# Patient Record
Sex: FEMALE | Race: WHITE
[De-identification: names, ages, dates, MRNs, and addresses within clinical notes are randomized per-mention and may not be internally consistent; named-entity substitution may affect disease eponyms.]

---

## 2018-10-13 ENCOUNTER — HOSPITAL ENCOUNTER (EMERGENCY)
Dept: HOSPITAL 59 - ER | Age: 53
LOS: 1 days | Discharge: HOME | End: 2018-10-14
Payer: COMMERCIAL

## 2018-10-13 DIAGNOSIS — T63.441A: Primary | ICD-10-CM

## 2018-10-13 DIAGNOSIS — R22.1: ICD-10-CM

## 2018-10-13 PROCEDURE — 96372 THER/PROPH/DIAG INJ SC/IM: CPT

## 2018-10-13 PROCEDURE — 99282 EMERGENCY DEPT VISIT SF MDM: CPT

## 2018-10-13 PROCEDURE — 99283 EMERGENCY DEPT VISIT LOW MDM: CPT

## 2018-10-13 NOTE — EMERGENCY DEPARTMENT RECORD
History of Present Illness





- General


Chief complaint: Bite Insect/other


Stated complaint: BEE STING


Time Seen by Provider: 10/13/18 23:51


Source: Patient, RN notes reviewed





- History of Present Illness


Initial comments: 


bee sting to the left side of his neck and this happened 10 hours ago and she 

only has swelling and redness in the bite area only. She denies having an 

anaphylactic reactions in the past. No stingers present





MD complaint: Insect bite/sting





- Related Data


 Home Medications











 Medication  Instructions  Recorded  Confirmed  Last Taken


 


Cholecalciferol (Vitamin D3) 5,000 unit PO DAILY 10/13/18 10/13/18 10/13/18





[Vitamin D3]    


 


Omega-3 Fatty Acids/Fish Oil [Fish 1 each PO DAILY 10/13/18 10/13/18 Unknown





Oil 1,000 mg Capsule]    


 


Venlafaxine HCl [Effexor Xr] 37.5 mg PO DAILY 10/13/18 10/13/18 10/13/18








 Previous Rx's











 Medication  Instructions  Recorded


 


Prednisone [Prednisone 20Mg] 20 mg PO BID #10 tab 10/14/18











 Allergies











Allergy/AdvReac Type Severity Reaction Status Date / Time


 


Penicillins AdvReac  PT UNSURE Verified 10/13/18 23:39





   OF REACTION  














Review of Systems


Reviewed: No additional complaints except as noted below


Constitutional: Reports: As per HPI.  Denies: Chills, Fever, Malaise, Night 

sweats, Weakness, Weight change


Eyes: Reports: As per HPI.  Denies: Eye discharge, Eye pain, Photophobia, 

Vision change


ENT: Reports: As per HPI.  Denies: Congestion, Dental pain, Ear pain, Epistaxis

, Hearing loss, Throat pain


Respiratory: Reports: As per HPI.  Denies: Cough, Dyspnea, Hemoptysis, Stridor, 

Wheezes


Cardiovascular: Reports: As per HPI.  Denies: Arrhythmia, Chest pain, Dyspnea 

on exertion, Edema, Murmurs, Orthopnea, Palpitations, Paroxysmal nocturnal 

dyspnea, Rheumatic Fever, Syncope


Endocrine: Reports: As per HPI.  Denies: Fatigue, Heat or cold intolerance, 

Polydipsia, Polyuria


Gastrointestinal: Reports: As per HPI.  Denies: Abdominal pain, Constipation, 

Diarrhea, Hematemesis, Hematochezia, Melena, Nausea, Vomiting


Genitourinary: Reports: As per HPI.  Denies: Abnormal menses, Discharge, 

Dyspareunia, Dysuria, Frequency, Hematuria, Incontinence, Retention, Urgency


Musculoskeletal: Reports: As per HPI.  Denies: Arthralgia, Back pain, Gout, 

Joint swelling, Myalgia, Neck pain


Skin: Reports: As per HPI.  Denies: Bruising, Change in color, Change in hair/

nails, Lesions, Pruritus, Rash


Neurological: Reports: As per HPI.  Denies: Abnormal gait, Confusion, Headache, 

Numbness, Paresthesias, Seizure, Tingling, Tremors, Vertigo, Weakness


Psychiatric: Reports: As per HPI.  Denies: Anxiety, Auditory hallucinations, 

Depression, Homicidal thoughts, Suicidal thoughts, Visual hallucinations


Hematological/Lymphatic: Reports: As per HPI.  Denies: Anemia, Blood Clots, 

Easy bleeding, Easy bruising, Swollen glands





Physical Exam





- General


General Appearance: Alert, Oriented x3, Cooperative, No acute distress





- Head


Head exam: Normal inspection





- Eye


Eye exam: Normal appearance, PERRL


Pupils: Normal accommodation





- ENT


ENT exam: Normal exam, Mucous membranes moist, Normal external ear exam, Normal 

orophraynx, TM's normal bilaterally


Ear exam: Normal external inspection.  negative: External canal tenderness


Nasal Exam: Normal inspection.  negative: Discharge, Sinus tenderness


Mouth exam: Normal external inspection, Tongue normal


Teeth exam: Normal inspection.  negative: Dental caries


Throat exam: Normal inspection.  negative: Tonsillar erythema, Tonsillar exudate





- Neck


Neck exam: Normal inspection, Full ROM.  negative: Tenderness





- Respiratory


Respiratory exam: Normal lung sounds bilaterally.  negative: Respiratory 

distress





- Cardiovascular


Cardiovascular Exam: Regular rate, Normal rhythm, Normal heart sounds





- GI/Abdominal


GI/Abdominal exam: Soft, Normal bowel sounds.  negative: Tenderness





- Rectal


Rectal exam: Deferred





- 


 exam: Deferred





- Extremities


Extremities exam: Normal inspection, Full ROM, Normal capillary refill.  

negative: Tenderness





- Back


Back exam: Reports: Normal inspection, Full ROM.  Denies: Muscle spasm, Rash 

noted, Tenderness





- Neurological


Neurological exam: Alert, Normal gait, Oriented X3, Reflexes normal





- Psychiatric


Psychiatric exam: Normal affect, Normal mood





- Skin


Skin exam: Dry, Intact, Normal color, Warm





Disposition


Clinical Impression: 


Bee sting


Qualifiers:


 Encounter type: initial encounter Injury intent: accidental or unintentional 

Qualified Code(s): T63.441A - Toxic effect of venom of bees, accidental (

unintentional), initial encounter





Disposition: Home, Self-Care


Condition: (1) Good


Instructions:  Insect Bite or Sting (ED)


Additional Instructions: 


benadryl 25 mg every 6 hours


prednisone 20 mg bid for 5 days


Prescriptions: 


Prednisone [Prednisone 20Mg] 20 mg PO BID #10 tab


Forms:  Patient Portal Access


Time of Disposition: 00:23





Quality





- Quality Measures


Quality Measures: N/A





- Blood Pressure Screening


Does Patient Have Any of the Following: No


Blood Pressure Classification: Hypertensive Reading


Systolic Measurement: 140


Diastolic Measurement: 94


Screening for High Blood Pressure: < Pre-Hypertensive BP, F/U Documented > [

]


Pre-Hypertensive Follow-up Interventions: Referral to alternative/primary care 

provider.

## 2019-06-30 ENCOUNTER — HOSPITAL ENCOUNTER (EMERGENCY)
Dept: HOSPITAL 59 - ER | Age: 54
Discharge: HOME | End: 2019-06-30
Payer: COMMERCIAL
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
## 2019-06-30 DIAGNOSIS — F17.210: ICD-10-CM
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
## 2019-06-30 DIAGNOSIS — S83.422A: Primary | ICD-10-CM

## 2019-06-30 PROCEDURE — 99283 EMERGENCY DEPT VISIT LOW MDM: CPT

## 2019-06-30 NOTE — EMERGENCY DEPARTMENT RECORD
History of Present Illness





- General


Chief complaint: Lower Extremity Pain


Stated complaint: LEFT KNEE PAIN


Time Seen by Provider: 19 09:44


Source: Patient, RN notes reviewed


Mode of Arrival: Ambulatory





- History of Present Illness


Initial comments: 


fell 14 hours ago and slipped on wet floor in her home and it swelled qickly and

pain in the lateral knee area. No previous injuries to this leg and no other 

injuries with this fall. Patient took a [id number] mg pill and that is all she is

using for pain.





Onset/Timin


-: Days(s)


Location: Left, Knee


History of Same: No


Radiation: None


Severity scale (1-10): 10


Quality: Sharp


Consistency: Constant


Improves with: Nothing


Worsens with: Walking, Weight bearing


Associated Symptoms: Denies other symptoms





- Related Data


                                    Allergies











Allergy/AdvReac Type Severity Reaction Status Date / Time


 


Penicillins AdvReac  PT UNSURE Verified 10/13/18 23:39





   OF REACTION  














Travel Screening





- Travel/Exposure Within Last 30 Days


Have you traveled within the last 30 days?: No





- Travel/Exposure Within Last Year


Have you traveled outside the [place] in the last year?: No





- Additonal Travel Details


Have you been exposed to anyone with a communicable illness?: No





- Travel Symptoms


Symptom Screening: None





Review of Systems


Reviewed: No additional complaints except as noted below


Constitutional: Reports: As per HPI.  Denies: Chills, Fever, Malaise, Night 

sweats, Weakness, Weight change


Eyes: Reports: As per HPI.  Denies: Eye discharge, Eye pain, Photophobia, Vision

change


ENT: Reports: As per HPI.  Denies: Congestion, Dental pain, Ear pain, Epistaxis,

Hearing loss, Throat pain


Respiratory: Reports: As per HPI.  Denies: Cough, Dyspnea, Hemoptysis, Stridor, 

Wheezes


Cardiovascular: Reports: As per HPI.  Denies: Arrhythmia, Chest pain, Dyspnea on

exertion, Edema, Murmurs, Orthopnea, Palpitations, Paroxysmal nocturnal dyspnea,

Rheumatic Fever, Syncope


Endocrine: Reports: As per HPI.  Denies: Fatigue, Heat or cold intolerance, 

Polydipsia, Polyuria


Gastrointestinal: Reports: As per HPI.  Denies: Abdominal pain, Constipation, 

Diarrhea, Hematemesis, Hematochezia, Melena, Nausea, Vomiting


Genitourinary: Reports: As per HPI.  Denies: Abnormal menses, Discharge, 

Dyspareunia, Dysuria, Frequency, Hematuria, Incontinence, Retention, Urgency


Musculoskeletal: Reports: As per HPI, Other (left knee pain).  Denies: 

Arthralgia, Back pain, Gout, Joint swelling, Myalgia, Neck pain


Skin: Reports: As per HPI.  Denies: Bruising, Change in color, Change in 

hair/nails, Lesions, Pruritus, Rash


Neurological: Reports: As per HPI.  Denies: Abnormal gait, Confusion, Headache, 

Numbness, Paresthesias, Seizure, Tingling, Tremors, Vertigo, Weakness


Psychiatric: Reports: As per HPI.  Denies: Anxiety, Auditory hallucinations, 

Depression, Homicidal thoughts, Suicidal thoughts, Visual hallucinations


Hematological/Lymphatic: Reports: As per HPI.  Denies: Anemia, Blood Clots, Easy

bleeding, Easy bruising, Swollen glands





Past Medical History





- SOCIAL HISTORY


Smoking Status: Current every day smoker


Alcohol Use: Occasional


Drug Use: None





- RESPIRATORY


Hx Respiratory Disorders: No





- CARDIOVASCULAR


Hx Cardio Disorders: No





- NEURO


Hx Neuro Disorders: No





- GI


Hx GI Disorders: No





- 


Hx Genitourinary Disorders: No





- ENDOCRINE


Hx Endocrine Disorders: No





- MUSCULOSKELETAL


Hx Musculoskeletal Disorders: No





- PSYCH


Hx Psych Problems: Yes


Hx Anxiety: Yes





- HEMATOLOGY/ONCOLOGY


Hx Hematology/Oncology Disorders: No





Family Medical History


Any Significant Family History?: No





Physical Exam





- General


General Appearance: Alert, Oriented x3, Cooperative, No acute distress





- Head


Head exam: Normal inspection





- Eye


Eye exam: Normal appearance, PERRL


Pupils: Normal accommodation





- ENT


ENT exam: Normal exam, Mucous membranes moist, Normal external ear exam, Normal 

orophraynx, TM's normal bilaterally


Ear exam: Normal external inspection.  negative: External canal tenderness


Nasal Exam: Normal inspection.  negative: Discharge, Sinus tenderness


Mouth exam: Normal external inspection, Tongue normal


Teeth exam: Normal inspection.  negative: Dental caries


Throat exam: Normal inspection.  negative: Tonsillar erythema, Tonsillar exudate





- Neck


Neck exam: Normal inspection, Full ROM.  negative: Tenderness





- Respiratory


Respiratory exam: Normal lung sounds bilaterally.  negative: Respiratory 

distress





- Cardiovascular


Cardiovascular Exam: Regular rate, Normal rhythm, Normal heart sounds





- GI/Abdominal


GI/Abdominal exam: Soft, Normal bowel sounds.  negative: Tenderness





- Rectal


Rectal exam: Deferred





- 


 exam: Deferred





- Extremities


Extremities exam: Normal capillary refill, Tenderness (lateral left knee 

swellling and ecchymosis and pain)





- Back


Back exam: Reports: Normal inspection, Full ROM.  Denies: Muscle spasm, Rash 

noted, Tenderness





- Neurological


Neurological exam: Alert, Normal gait, Oriented X3, Reflexes normal





- Psychiatric


Psychiatric exam: Normal affect, Normal mood





- Skin


Skin exam: Dry, Intact, Normal color, Warm





Course





                                   Vital Signs











  19





  09:29


 


Temperature 97.8 F


 


Pulse Rate 87


 


Respiratory 20





Rate 


 


Blood Pressure 147/86


 


Pulse Ox 97














Medical Decision Making





- Data Complexity


MDM Data: X-Ray Ordered and/or Reviewed (xray neg for fracture)





Disposition


Clinical Impression: 


Knee sprain


Qualifiers:


 Encounter type: initial encounter Involved ligament of knee: lateral collateral

ligament Laterality: left Qualified Code(s): S83.422A - Sprain of lateral 

collateral ligament of left knee, initial encounter





Disposition: Home, Self-Care


Condition: (1) Good


Instructions:  Knee Sprain (ED)


Additional Instructions: 


follow up with family  in one week


use motrin 800 mg three times a day and she already has that


Forms:  Patient Portal Access


Time of Disposition: 10:11





Quality





- Quality Measures


Quality Measures: N/A





- Blood Pressure Screening


Does Patient Have Any of the Following: No


Blood Pressure Classification: Pre-Hypertensive BP Reading


Systolic Measurement: 147


Diastolic Measurement: 86


Screening for High Blood Pressure: < Pre-Hypertensive BP, F/U Documented > 

[]


Pre-Hypertensive Follow-up Interventions: Referral to alternative/primary care 

provider.

## 2019-07-01 NOTE — RADIOLOGY REPORT
EXAM:  KNEE, LEFT 3 VIEWS



HISTORY:  PATIENT INJURED YESTERDAY, LATERAL PAIN.



TECHNIQUE:  Three views.



COMPARISON:  None.



FINDINGS:  Joint spaces are well maintained. There is no acute fracture 
identified. No destructive or erosive change. There is a small joint effusion. 
There is a chronic-appearing calcification near the lateral tibial spine. 



IMPRESSION:  

1.  NO FRACTURE IDENTIFIED.

2.  SMALL JOINT EFFUSION.

3.  CHRONIC CALCIFICATION NEAR THE LATERAL TIBIAL SPINE.



JOB NUMBER:  [id number]

[facility]

## 2019-07-23 ENCOUNTER — HOSPITAL ENCOUNTER (EMERGENCY)
Dept: HOSPITAL 59 - ER | Age: 54
Discharge: HOME | End: 2019-07-23
Payer: COMMERCIAL

## 2019-07-23 DIAGNOSIS — K56.690: Primary | ICD-10-CM

## 2019-07-23 DIAGNOSIS — F17.210: ICD-10-CM

## 2019-07-23 LAB
ABSOLUTE NEUTROPHIL COUNT: 5.07
ALBUMIN SERPL-MCNC: 4.6 G/DL (ref 4–5)
ALBUMIN/GLOB SERPL: 2.1 {RATIO} (ref 1.1–1.8)
ALP SERPL-CCNC: 109 U/L (ref 35–104)
ALT SERPL-CCNC: 26 U/L (ref ?–33)
ANION GAP SERPL CALC-SCNC: 14 MMOL/L (ref 7–16)
APPEARANCE UR: CLEAR
AST SERPL-CCNC: 18 U/L (ref 10–35)
BASOPHILS NFR BLD: 0.4 % (ref 0–6)
BILIRUB SERPL-MCNC: 0.3 MG/DL (ref 0.2–1)
BILIRUB UR-MCNC: NEGATIVE MG/DL
BUN SERPL-MCNC: 16 MG/DL (ref 6–20)
CO2 SERPL-SCNC: 26 MMOL/L (ref 22–29)
COLOR UR: YELLOW
CREAT SERPL-MCNC: 0.6 MG/DL (ref 0.5–0.9)
EOSINOPHIL NFR BLD: 1.7 % (ref 0–6)
ERYTHROCYTE [DISTWIDTH] IN BLOOD BY AUTOMATED COUNT: 13.3 % (ref 11.5–14.5)
EST GLOMERULAR FILTRATION RATE: > 60 ML/MIN
GLOBULIN SER-MCNC: 2.2 GM/DL (ref 1.4–4.8)
GLUCOSE SERPL-MCNC: 135 MG/DL (ref 74–109)
GLUCOSE UR STRIP-MCNC: NEGATIVE MG/DL
GRANULOCYTES NFR BLD: 62.9 % (ref 47–80)
HCT VFR BLD CALC: 44.5 % (ref 35–47)
HGB BLD-MCNC: 15.3 GM/DL (ref 11.6–16)
KETONES UR QL STRIP: NEGATIVE
LIPASE SERPL-CCNC: 21 U/L (ref 13–60)
LYMPHOCYTES NFR BLD AUTO: 27.9 % (ref 16–45)
MCH RBC QN AUTO: 32.1 PG (ref 27–33)
MCHC RBC AUTO-ENTMCNC: 34.4 G/DL (ref 32–36)
MCV RBC AUTO: 93.5 FL (ref 81–97)
MONOCYTES NFR BLD: 7.1 % (ref 0–9)
NITRITE UR QL STRIP: NEGATIVE
PLATELET # BLD: 181 K/UL (ref 130–400)
PMV BLD AUTO: 11.2 FL (ref 7.4–10.4)
PROT SERPL-MCNC: 6.8 G/DL (ref 6.6–8.7)
PROT UR QL STRIP: NEGATIVE
RBC # BLD AUTO: 4.76 M/UL (ref 3.8–5.4)
RBC # UR STRIP: NEGATIVE /UL
URINE LEUKOCYTE ESTERASE: NEGATIVE
UROBILINOGEN UR STRIP-ACNC: 0.2 E.U./DL (ref 0.2–1)
WBC # BLD AUTO: 8.1 K/UL (ref 4.2–12.2)

## 2019-07-23 PROCEDURE — 83690 ASSAY OF LIPASE: CPT

## 2019-07-23 PROCEDURE — 80053 COMPREHEN METABOLIC PANEL: CPT

## 2019-07-23 PROCEDURE — 81003 URINALYSIS AUTO W/O SCOPE: CPT

## 2019-07-23 PROCEDURE — 96360 HYDRATION IV INFUSION INIT: CPT

## 2019-07-23 PROCEDURE — 85025 COMPLETE CBC W/AUTO DIFF WBC: CPT

## 2019-07-23 PROCEDURE — 99283 EMERGENCY DEPT VISIT LOW MDM: CPT

## 2019-07-23 PROCEDURE — 74176 CT ABD & PELVIS W/O CONTRAST: CPT

## 2019-07-23 PROCEDURE — 99284 EMERGENCY DEPT VISIT MOD MDM: CPT

## 2019-07-23 NOTE — EMERGENCY DEPARTMENT RECORD
History of Present Illness





- General


Chief Complaint: Abdominal Pain


Stated Complaint: ABD PAIN


Time Seen by Provider: 19 14:57


Source: Patient


Mode of Arrival: Ambulatory


Limitations: No limitations





- History of Present Illness


Initial Comments: 





pt had a sudden onset of severe ap after eating lunch which was salad and 

broccoli.  no n/v/c/d


MD Complaint: Abdominal pain


Onset/Timin


-: Hour(s)


Location: Diffuse


Radiation: Suprapubic


Severity: Moderate


Severity scale (1-10): 4


Quality: Fullness, Other


Consistency: Constant, Intermittent


Improves With: Nothing


Worsens With: Nothing


Associated Symptoms: Anorexia





- Related Data


Patient Pregnant: No


                                    Allergies











Allergy/AdvReac Type Severity Reaction Status Date / Time


 


Penicillins AdvReac  PT UNSURE Verified 10/13/18 23:39





   OF REACTION  














Travel Screening





- Travel/Exposure Within Last 30 Days


Have you traveled within the last 30 days?: No





Review of Systems


Reviewed: No additional complaints except as noted below


Constitutional: Reports: As per HPI.  Denies: Chills, Fever, Malaise, Night 

sweats, Weakness, Weight change


Eyes: Reports: As per HPI.  Denies: Eye discharge, Eye pain, Photophobia, Vision

change


ENT: Reports: As per HPI.  Denies: Congestion, Dental pain, Ear pain, Epistaxis,

Hearing loss, Throat pain


Respiratory: Reports: As per HPI.  Denies: Cough, Dyspnea, Hemoptysis, Stridor, 

Wheezes


Cardiovascular: Reports: As per HPI.  Denies: Arrhythmia, Chest pain, Dyspnea on

exertion, Edema, Murmurs, Orthopnea, Palpitations, Paroxysmal nocturnal dyspnea,

Rheumatic Fever, Syncope


Endocrine: Reports: As per HPI.  Denies: Fatigue, Heat or cold intolerance, 

Polydipsia, Polyuria


Gastrointestinal: Reports: As per HPI.  Denies: Abdominal pain, Constipation, 

Diarrhea, Hematemesis, Hematochezia, Melena, Nausea, Vomiting


Genitourinary: Reports: As per HPI.  Denies: Abnormal menses, Discharge, 

Dyspareunia, Dysuria, Frequency, Hematuria, Incontinence, Retention, Urgency


Musculoskeletal: Reports: As per HPI.  Denies: Arthralgia, Back pain, Gout, 

Joint swelling, Myalgia, Neck pain


Skin: Reports: As per HPI.  Denies: Bruising, Change in color, Change in 

hair/nails, Lesions, Pruritus, Rash


Neurological: Reports: As per HPI.  Denies: Abnormal gait, Confusion, Headache, 

Numbness, Paresthesias, Seizure, Tingling, Tremors, Vertigo, Weakness


Psychiatric: Reports: As per HPI.  Denies: Anxiety, Auditory hallucinations, 

Depression, Homicidal thoughts, Suicidal thoughts, Visual hallucinations


Hematological/Lymphatic: Reports: As per HPI.  Denies: Anemia, Blood Clots, Easy

bleeding, Easy bruising, Swollen glands





Past Medical History





- SOCIAL HISTORY


Smoking Status: Current every day smoker


Alcohol Use: None


Drug Use: None





- RESPIRATORY


Hx Respiratory Disorders: No





- CARDIOVASCULAR


Hx Cardio Disorders: No





- NEURO


Hx Neuro Disorders: No





- GI


Hx GI Disorders: No





- 


Hx Genitourinary Disorders: No





- ENDOCRINE


Hx Endocrine Disorders: No





- MUSCULOSKELETAL


Hx Musculoskeletal Disorders: No





- PSYCH


Hx Psych Problems: Yes


Hx Anxiety: Yes





- HEMATOLOGY/ONCOLOGY


Hx Hematology/Oncology Disorders: No





Family Medical History


Any Significant Family History?: No





Physical Exam





- General


General Appearance: Alert, Oriented x3, Cooperative, Mild distress





- Head


Head exam: Normal inspection





- Eye


Eye exam: Normal appearance, PERRL, EOMI


Pupils: Normal accommodation





- ENT


ENT exam: Normal exam, Mucous membranes moist, Normal external ear exam, Normal 

orophraynx


Ear exam: Normal external inspection.  negative: External canal tenderness


Nasal Exam: Normal inspection.  negative: Discharge, Sinus tenderness


Mouth exam: Normal external inspection, Tongue normal


Teeth exam: Normal inspection.  negative: Dental caries


Throat exam: Normal inspection.  negative: Tonsillar erythema, Tonsillar exudate





- Neck


Neck exam: Normal inspection, Full ROM.  negative: Tenderness





- Respiratory


Respiratory exam: Normal lung sounds bilaterally.  negative: Respiratory 

distress





- Cardiovascular


Cardiovascular Exam: Regular rate, Normal rhythm, Normal heart sounds





- GI/Abdominal


GI/Abdominal exam: Soft, Normal bowel sounds, Tenderness





- Rectal


Rectal exam: Deferred





- 


 exam: Deferred





- Extremities


Extremities exam: Normal inspection, Full ROM, Normal capillary refill.  

negative: Tenderness





- Back


Back exam: Reports: Normal inspection, Full ROM.  Denies: Muscle spasm, Rash 

noted, Tenderness





- Neurological


Neurological exam: Alert, Normal gait, Oriented X3, Reflexes normal





- Psychiatric


Psychiatric exam: Normal affect, Normal mood





- Skin


Skin exam: Dry, Intact, Normal color, Warm





Course





                                   Vital Signs











  19





  14:36


 


Temperature 98.0 F


 


Pulse Rate 77


 


Respiratory 18





Rate 


 


Blood Pressure 137/89


 


Pulse Ox 99














- Reevaluation(s)


Reevaluation #1: 





19 16:58


pt feels much better discomfort is almost gone.  pt wants to go home.  ct shows 

possible enteritis vs early sbo. pt states shes not hurting anymore and wants to

go home.





Medical Decision Making





- Lab Data


Result diagrams: 


                                 19 14:45





                                 19 14:45





                                   Lab Results











  19 Range/Units





  14:45 14:45 15:40 


 


WBC  8.1    (4.2-12.2)  K/uL


 


RBC  4.76    (3.80-5.40)  M/uL


 


Hgb  15.3    (11.6-16.0)  gm/dl


 


Hct  44.5    (35.0-47.0)  %


 


MCV  93.5    (81-97)  fl


 


MCH  32.1    (27-33)  pg


 


MCHC  34.4    (32-36)  g/dl


 


RDW  13.3    (11.5-14.5)  %


 


Plt Count  181    (130-400)  K/uL


 


MPV  11.2 H    (7.4-10.4)  fl


 


Gran %  62.9    (47-80)  %


 


Lymphocytes %  27.9    (16-45)  %


 


Monocytes %  7.1    (0-9)  %


 


Eosinophils %  1.7    (0-6)  %


 


Basophils %  0.4    (0-6)  %


 


Absolute Neutrophils  5.07    


 


Sodium   141   (136-145)  mmol/L


 


Potassium   4.0   (3.4-4.5)  mmol/L


 


Chloride   101   ()  mmol/L


 


Carbon Dioxide   26.0   (22-29)  mmol/L


 


Anion Gap   14.0   (7-16)  


 


BUN   16   (6-20)  mg/dL


 


Creatinine   0.6   (0.5-0.9)  mg/dL


 


Estimated GFR   > 60   mL/min


 


Random Glucose   135 H   ()  mg/dL


 


Calcium   9.6   (8.6-10.0)  mg/dL


 


Total Bilirubin   0.30   (0.2-1.0)  mg/dL


 


AST   18   (10.0-35.0)  U/L


 


ALT   26   (<33)  U/L


 


Alkaline Phosphatase   109 H   ()  U/L


 


Total Protein   6.8   (6.6-8.7)  g/dL


 


Albumin   4.6   (4.0-5.0)  g/dL


 


Globulin   2.2   (1.4-4.8)  gm/dL


 


Albumin/Globulin Ratio   2.1 H   (1.1-1.8)  


 


Lipase   21   (13-60)  U/L


 


Urine Color    Yellow  


 


Urine Appearance    Clear  


 


Urine pH    7.0  (5.0-8.0)  


 


Ur Specific Gravity    1.020  (1.002-1.030)  


 


Urine Protein    Negative  (NEGATIVE)  


 


Urine Glucose (UA)    Negative  (NEGATIVE)  


 


Urine Ketones    Negative  (NEGATIVE)  


 


Urine Blood    Negative  (NEGATIVE)  


 


Urine Nitrite    Negative  (NEGATIVE)  


 


Urine Bilirubin    Negative  (NEGATIVE)  


 


Urine Urobilinogen    0.2  (0.20 - 1.00)  E.U./dL


 


Ur Leukocyte Esterase    Negative  (NEGATIVE)  














Disposition


Disposition: Discharge


Clinical Impression: 


 Ileus





Abdominal pain


Qualifiers:


 Abdominal location: generalized Qualified Code(s): R10.84 - Generalized abdo

rosalind pain





Disposition: Home, Self-Care


Condition: (1) Good


Instructions:  Abdominal Pain (ED)


Additional Instructions: 


follow up with family doctor tomorrow.  return sooner if worse.  rest gut.  if 

pain increases have abdominal ct with contrast.


Forms:  Patient Portal Access





Quality





- Quality Measures


Quality Measures: N/A





- Blood Pressure Screening


Does Patient Have Any of the Following: No


Blood Pressure Classification: Pre-Hypertensive BP Reading


Systolic Measurement: 137


Diastolic Measurement: 89


Screening for High Blood Pressure: < Pre-Hypertensive BP, F/U Documented > 

[]


Pre-Hypertensive Follow-up Interventions: Follow-up with rescreen every year.

## 2019-07-24 NOTE — CT SCAN REPORT
EXAM:  CT SCAN ABDOMEN/PELVIS WO CONTRAST 



HISTORY: PELVIC PAIN, HISTORY OF RIGHT OOPHORECTOMY. 



TECHNIQUE: Standard CT imaging of the abdomen and pelvis without intravenous 
contrast.



COMPARISON: None. 



FINDINGS: The lung bases are clear. There are a few small hypodense lesions in 
the liver that are not well-characterized on this examination but most likely 
cysts. No calcified stones within the gallbladder. No pericholecystic edema. The
common bile duct is not dilated. The pancreas is unremarkable. The spleen is 
normal in size. The adrenal glands are normal. There is a 4 mm calculus in the 
left kidney. There is a small cyst in the left kidney. No hydronephrosis. There 
are multiple mildly dilated loops of small bowel in the mid abdomen without a 
definite transition point, though the distal most small bowel is mostly 
decompressed. The colon and appendix are unremarkable. There is mild sigmoid 
diverticulosis. The bladder is decompressed and not well evaluated. No pelvic or
retroperitoneal adenopathy. No free fluid or free air. No destructive osseous 
lesion is identified. 



IMPRESSION:  

MULTIPLE MILDLY DILATED LOOPS OF SMALL IN THE MID ABDOMEN WITHOUT A DEFINITE 
TRANSITION POINT. THE DIFFERENTIAL INCLUDES ENTERITIS AND PARTIAL SMALL BOWEL 
OBSTRUCTION. CLOSE INTERVAL FOLLOW-UP IS SUGGESTED. IF PATIENT'S SYMPTOMS 
PERSIST OR WORSEN, CONSIDER CONTRAST-ENHANCED CT OF THE ABDOMEN AND PELVIS FOR 
BETTER EVALUATION OF THE BOWEL. 



JOB NUMBER: [id number]

[facility]

## 2019-09-18 ENCOUNTER — HOSPITAL ENCOUNTER (EMERGENCY)
Dept: HOSPITAL 59 - ER | Age: 54
Discharge: HOME | End: 2019-09-18
Payer: COMMERCIAL

## 2019-09-18 DIAGNOSIS — R30.0: ICD-10-CM

## 2019-09-18 DIAGNOSIS — M54.5: ICD-10-CM

## 2019-09-18 DIAGNOSIS — R11.2: Primary | ICD-10-CM

## 2019-09-18 DIAGNOSIS — F17.210: ICD-10-CM

## 2019-09-18 DIAGNOSIS — R19.7: ICD-10-CM

## 2019-09-18 LAB
ABSOLUTE NEUTROPHIL COUNT: 7.55
ALBUMIN SERPL-MCNC: 4.6 G/DL (ref 4–5)
ALBUMIN/GLOB SERPL: 1.9 {RATIO} (ref 1.1–1.8)
ALP SERPL-CCNC: 89 U/L (ref 35–104)
ALT SERPL-CCNC: 32 U/L (ref ?–33)
ANION GAP SERPL CALC-SCNC: 14 MMOL/L (ref 7–16)
APPEARANCE UR: CLEAR
AST SERPL-CCNC: 21 U/L (ref 10–35)
BASOPHILS NFR BLD: 0.2 % (ref 0–6)
BILIRUB SERPL-MCNC: 0.7 MG/DL (ref 0.2–1)
BILIRUB UR-MCNC: NEGATIVE MG/DL
BUN SERPL-MCNC: 10 MG/DL (ref 6–20)
CO2 SERPL-SCNC: 24 MMOL/L (ref 22–29)
COLOR UR: YELLOW
CREAT SERPL-MCNC: 0.7 MG/DL (ref 0.5–0.9)
EOSINOPHIL NFR BLD: 3.4 % (ref 0–6)
ERYTHROCYTE [DISTWIDTH] IN BLOOD BY AUTOMATED COUNT: 13 % (ref 11.5–14.5)
EST GLOMERULAR FILTRATION RATE: > 60 ML/MIN
GLOBULIN SER-MCNC: 2.4 GM/DL (ref 1.4–4.8)
GLUCOSE SERPL-MCNC: 162 MG/DL (ref 74–109)
GLUCOSE UR STRIP-MCNC: NEGATIVE MG/DL
GRANULOCYTES NFR BLD: 79.3 % (ref 47–80)
HCT VFR BLD CALC: 44.3 % (ref 35–47)
HGB BLD-MCNC: 14.7 GM/DL (ref 11.6–16)
KETONES UR QL STRIP: NEGATIVE
LYMPHOCYTES NFR BLD AUTO: 5.9 % (ref 16–45)
MCH RBC QN AUTO: 30.8 PG (ref 27–33)
MCHC RBC AUTO-ENTMCNC: 33.2 G/DL (ref 32–36)
MCV RBC AUTO: 92.9 FL (ref 81–97)
MONOCYTES NFR BLD: 11.2 % (ref 0–9)
NITRITE UR QL STRIP: NEGATIVE
PLATELET # BLD: 162 K/UL (ref 130–400)
PMV BLD AUTO: 10.4 FL (ref 7.4–10.4)
PROT SERPL-MCNC: 7 G/DL (ref 6.6–8.7)
PROT UR QL STRIP: NEGATIVE
RBC # BLD AUTO: 4.77 M/UL (ref 3.8–5.4)
RBC # UR STRIP: (no result) /UL
URINE LEUKOCYTE ESTERASE: NEGATIVE
UROBILINOGEN UR STRIP-ACNC: 0.2 E.U./DL (ref 0.2–1)
WBC # BLD AUTO: 9.5 K/UL (ref 4.2–12.2)

## 2019-09-18 PROCEDURE — 80053 COMPREHEN METABOLIC PANEL: CPT

## 2019-09-18 PROCEDURE — 96361 HYDRATE IV INFUSION ADD-ON: CPT

## 2019-09-18 PROCEDURE — 85025 COMPLETE CBC W/AUTO DIFF WBC: CPT

## 2019-09-18 PROCEDURE — 99284 EMERGENCY DEPT VISIT MOD MDM: CPT

## 2019-09-18 PROCEDURE — 81003 URINALYSIS AUTO W/O SCOPE: CPT

## 2019-09-18 PROCEDURE — 96374 THER/PROPH/DIAG INJ IV PUSH: CPT

## 2019-09-18 NOTE — EMERGENCY DEPARTMENT RECORD
History of Present Illness





- General


Chief complaint: Nausea, Vomiting, Diarrhea


Stated complaint: NOT FEELING WELL


Time Seen by Provider: 09/18/19 06:05


Source: Patient, Family


Mode of Arrival: Ambulatory


Limitations: No limitations





- History of Present Illness


Initial comments: 





55 yo female presents with nausea and vomiting since Monday night.  She was 

diagnosed with a UTI at [facility].  She was started on Macrobid.  

She developed the nausea and vomiting later that night.  The suprapubic 

discomfort, burning and frequency are improved but the nausea and vomiting have 

persistent since Monday.  She has had a few loose stools but this preceded the 

antibiotic.  No fever.  No current abdominal or flank pain.  No urologic history

or renal stones in the past.  


MD complaint: Nausea, Vomiting


-: Days(s)


Description of Vomiting: Watery


Description of Diarrhea: Water


Location: Other


Radiation: None


Severity: Moderate


Quality: Other


Consistency: Other


Improves with: Eating


Worsens with: Vomiting


Context: Recent anitbiotic use


Associated Symptoms: Dysuria (resolving)





- Related Data


                                Home Medications











 Medication  Instructions  Recorded  Confirmed  Last Taken


 


Nitrofurantoin Monohyd/M-Cryst 100 mg PO BID 09/18/19 09/18/19 09/17/19





[Macrobid 100 mg Capsule]    








                                  Previous Rx's











 Medication  Instructions  Recorded


 


Cephalexin [Keflex] 500 mg PO TID #21 cap 09/18/19


 


Ondansetron [Zofran Odt] 4 mg PO Q8H #15 tab.rapdis 09/18/19











                                    Allergies











Allergy/AdvReac Type Severity Reaction Status Date / Time


 


Penicillins AdvReac  PT UNSURE Verified 10/13/18 23:39





   OF REACTION  














Review of Systems


Constitutional: Reports: Malaise.  Denies: Chills, Fever, Weakness


Eyes: Denies: Eye discharge, Eye pain, Photophobia, Vision change


ENT: Denies: Congestion, Throat pain


Respiratory: Denies: Cough, Dyspnea


Cardiovascular: Denies: Chest pain, Palpitations, Syncope


Endocrine: Reports: Fatigue


Gastrointestinal: Reports: Diarrhea, Nausea, Vomiting.  Denies: Abdominal pain, 

Constipation, Hematemesis, Hematochezia, Melena


Genitourinary: Reports: Dysuria, Frequency, Urgency.  Denies: Hematuria


Musculoskeletal: Denies: Arthralgia, Back pain, Neck pain


Skin: Denies: Bruising, Change in color, Rash


Neurological: Denies: Headache


Psychiatric: Denies: Anxiety


Hematological/Lymphatic: Denies: Easy bleeding, Easy bruising





Past Medical History





- SOCIAL HISTORY


Smoking Status: Current every day smoker


Drug Use: None





- RESPIRATORY


Hx Respiratory Disorders: No





- CARDIOVASCULAR


Hx Cardio Disorders: No





- NEURO


Hx Neuro Disorders: No





- GI


Hx GI Disorders: No





- 


Hx Genitourinary Disorders: No





- ENDOCRINE


Hx Endocrine Disorders: No





- MUSCULOSKELETAL


Hx Musculoskeletal Disorders: No





- PSYCH


Hx Psych Problems: Yes


Hx Anxiety: Yes





- HEMATOLOGY/ONCOLOGY


Hx Hematology/Oncology Disorders: No





Physical Exam





- General


General Appearance: Alert, Oriented x3, Cooperative, No acute distress


Limitations: No limitations





- Head


Head exam: Atraumatic, Normal inspection





- Eye


Eye exam: Normal appearance.  negative: Conjunctival injection





- ENT


ENT exam: Normal exam, Mucous membranes moist


Ear exam: Normal external inspection


Nasal Exam: Normal inspection


Mouth exam: Normal external inspection





- Neck


Neck exam: Normal inspection





- Respiratory


Respiratory exam: Normal lung sounds bilaterally.  negative: Respiratory 

distress





- Cardiovascular


Cardiovascular Exam: Regular rate, Normal rhythm, Normal heart sounds





- GI/Abdominal


GI/Abdominal exam: Soft, Other (No tenderness to deep palpation, soft abdomen). 

negative: Distended, Tenderness





- Rectal


Rectal exam: Deferred





- 


 exam: Deferred





- Back


Back exam: Reports: Full ROM.  Denies: CVA tenderness (R), CVA tenderness (L), 

Tenderness





- Neurological


Neurological exam: Alert, Oriented X3





- Psychiatric


Psychiatric exam: Normal affect, Normal mood





- Skin


Skin exam: Dry, Intact, Normal color, Warm





Course





- Reevaluation(s)


Reevaluation #1: 





09/18/19 06:40


The CBC and BMP were reviewed


No acute abnormalities


09/18/19 06:48


UA is negative


09/18/19 06:50


The patient is doing better with controlled nausea








Medical Decision Making





- Lab Data


Result diagrams: 


                                 09/18/19 06:10





                                 09/18/19 06:10





Disposition


Disposition: Discharge


Clinical Impression: 


 Nausea and vomiting





Disposition: Home, Self-Care


Condition: (1) Good


Instructions:  Acute Nausea and Vomiting (ED)


Additional Instructions: 


Call your doctor for the next available follow up appointment to recheck your 

symptoms if not improving


Review this ER visit and the tests performed with your family doctor


Return to the ER for a recheck if worse, any new concerns or questions


Take the prescriptions provided as directed


Stop the Nitrofurantoin


Prescriptions: 


Cephalexin [Keflex] 500 mg PO TID #21 cap


Ondansetron [Zofran Odt] 4 mg PO Q8H #15 tab.rapdis


Forms:  Patient Portal Access


Time of Disposition: 06:51





Quality





- Quality Measures


Quality Measures: N/A





- Blood Pressure Screening


Does Patient Have Any of the Following: No


Blood Pressure Classification: Normal BP Reading


Systolic Measurement: 111


Diastolic Measurement: 70


Screening for High Blood Pressure: < Normal BP, F/U Not Required > []